# Patient Record
Sex: MALE | Race: WHITE | NOT HISPANIC OR LATINO | Employment: FULL TIME | URBAN - METROPOLITAN AREA
[De-identification: names, ages, dates, MRNs, and addresses within clinical notes are randomized per-mention and may not be internally consistent; named-entity substitution may affect disease eponyms.]

---

## 2022-05-24 ENCOUNTER — OFFICE VISIT (OUTPATIENT)
Dept: SURGERY | Facility: CLINIC | Age: 57
End: 2022-05-24

## 2022-05-24 VITALS
TEMPERATURE: 96.2 F | HEART RATE: 95 BPM | OXYGEN SATURATION: 98 % | WEIGHT: 187.7 LBS | DIASTOLIC BLOOD PRESSURE: 90 MMHG | RESPIRATION RATE: 16 BRPM | HEIGHT: 69 IN | SYSTOLIC BLOOD PRESSURE: 138 MMHG | BODY MASS INDEX: 27.8 KG/M2

## 2022-05-24 DIAGNOSIS — K62.89 HYPERTROPHY OF ANAL PAPILLAE: Primary | ICD-10-CM

## 2022-05-24 PROCEDURE — 99203 OFFICE O/P NEW LOW 30 MIN: CPT | Performed by: SURGERY

## 2022-05-24 RX ORDER — BUPROPION HYDROCHLORIDE 150 MG/1
1 TABLET ORAL EVERY MORNING
COMMUNITY
Start: 2022-02-01

## 2022-05-24 RX ORDER — AMLODIPINE BESYLATE 5 MG/1
5 TABLET ORAL DAILY
COMMUNITY
Start: 2015-09-10 | End: 2022-08-12

## 2022-06-01 NOTE — PROGRESS NOTES
Chief Complaint   Patient presents with   • Rectal Bleeding     Spotting off and on for 6 months        Patient is a 56 y.o. male referred by Catracho Cruz MD for evaluation of intermittent rectal bleeding.  Patient reports it is spotting on the toilet tissue and is bright red.  This has been intermittently occurring over the last 6 months.  Patient reports he has known hemorrhoids and has been using hemorrhoidal cream but can feel a protrusion.  He reports he was examined by his dermatologist and was advised that this could be a hemorrhoidal papilla.    Past Medical History:   Diagnosis Date   • Anxiety    • Hypertension      History reviewed. No pertinent surgical history.  History reviewed. No pertinent family history.  Social History     Tobacco Use   • Smoking status: Never Smoker   • Smokeless tobacco: Never Used   Substance Use Topics   • Alcohol use: Yes     Comment: 4 drinks weekly     Allergies   Allergen Reactions   • Pollen Extract Cough       Current Outpatient Medications:   •  amLODIPine (NORVASC) 5 MG tablet, Take 5 mg by mouth Daily., Disp: , Rfl:   •  buPROPion XL (WELLBUTRIN XL) 150 MG 24 hr tablet, Take 1 tablet by mouth Every Morning., Disp: , Rfl:     Review of Systems  General: Patient reports during good health  Eyes: No eye problems  Ears, nose, mouth and throat: No rhinitis, no hearing problems, no chronic cough  Cardiovascular/heart: Denies palpitations, syncope or chest pain  Respiratory/lung: Denies shortness of breath, hemoptysis, dyspnea on exertion   Genital/urinary: No frequency, hematuria or dysuria  Hematological/lymphatic: Denies anemia or other problems  Musculoskeletal: No joint pain, no defects  Skin: No psoriasis or other skin issues  Neurological: No seizures or other neurological problems  Psychiatric: Anxiety  Endocrine: Negative  Gastro-intestinal: No constipation, no diarrhea, no melena, no hematochezia, see HPI  Vitals:    05/24/22 1154   BP: 138/90   Pulse:  "95   Resp: 16   Temp: 96.2 °F (35.7 °C)   SpO2: 98%   Weight: 85.1 kg (187 lb 11.2 oz)   Height: 175.3 cm (69\")       Physical Exam  General/psychological:   Alert and oriented x3, in no acute distress  HEENT: Normocephalic, atraumatic, PERRLA, EOMI, sclerae anicteric, moist mucous membranes, neck is supple, no JVD  Rectal: There is no evidence of HPV, patient does have hemorrhoids without thrombosis stage II, patient has an anal papilla that appears irritated  Musculoskeletal: Moves all 4 ext, no clubbing, no cyanosis or edema  Neurovascular: Grossly intact  Debilities: None  Emotional behavior: Appropriate     Assessment:  Symptomatic anal papilla  Plan:  I have recommended that the patient undergo excision of the anal papilla as it does appear irritated and is causing him symptoms.  I have discussed this procedure in detail with the patient.  I have discussed the risks, benefits and alternatives.  I have discussed the risk of anesthesia, bleeding, infection and recurrence.  Patient understands these risk and wishes to proceed.    Radha Taylor MD  General, Minimally Invasive and Endoscopic Surgery  [facility]      [address] [facility] [address] [facility] [address]    [address]  [place], [place] [id number] [place] [id number]    P: [phone]  F: [phone]    Cc:  Catracho Cruz MD      "